# Patient Record
Sex: FEMALE | Race: WHITE | NOT HISPANIC OR LATINO | URBAN - METROPOLITAN AREA
[De-identification: names, ages, dates, MRNs, and addresses within clinical notes are randomized per-mention and may not be internally consistent; named-entity substitution may affect disease eponyms.]

---

## 2022-03-29 PROBLEM — Z00.00 ENCOUNTER FOR PREVENTIVE HEALTH EXAMINATION: Status: ACTIVE | Noted: 2022-03-29

## 2023-02-09 ENCOUNTER — OFFICE (OUTPATIENT)
Dept: URBAN - METROPOLITAN AREA CLINIC 29 | Facility: CLINIC | Age: 73
Setting detail: OPHTHALMOLOGY
End: 2023-02-09
Payer: MEDICARE

## 2023-02-09 DIAGNOSIS — H01.005: ICD-10-CM

## 2023-02-09 DIAGNOSIS — H43.393: ICD-10-CM

## 2023-02-09 DIAGNOSIS — H01.001: ICD-10-CM

## 2023-02-09 DIAGNOSIS — H01.002: ICD-10-CM

## 2023-02-09 DIAGNOSIS — H02.834: ICD-10-CM

## 2023-02-09 DIAGNOSIS — H01.004: ICD-10-CM

## 2023-02-09 DIAGNOSIS — Z96.1: ICD-10-CM

## 2023-02-09 DIAGNOSIS — H02.831: ICD-10-CM

## 2023-02-09 PROCEDURE — 92014 COMPRE OPH EXAM EST PT 1/>: CPT | Performed by: OPHTHALMOLOGY

## 2023-02-09 PROCEDURE — 92201 OPSCPY EXTND RTA DRAW UNI/BI: CPT | Performed by: OPHTHALMOLOGY

## 2023-02-09 ASSESSMENT — REFRACTION_MANIFEST
OD_CYLINDER: +0.25
OS_AXIS: 177
OD_VA1: 20/25+2
OD_AXIS: 010
OD_ADD: +2.50
OD_SPHERE: +1.50
OD_AXIS: 175
OS_CYLINDER: +1.50
OS_VA1: 20/30+2
OD_VA1: 20/30
OD_SPHERE: -0.25
OS_SPHERE: +1.75
OS_ADD: +2.50
OS_SPHERE: -0.50
OS_CYLINDER: +1.50
OD_SPHERE: +1.50
OD_CYLINDER: +1.75
OS_VA1: 20/25
OS_VA1: 20/25
OS_SPHERE: +2.00
OD_CYLINDER: +0.50
OD_SPHERE: -0.50
OS_CYLINDER: +0.50
OS_ADD: +2.50
OD_AXIS: 175
OD_VA1: 20/40
OD_ADD: +2.50
OS_AXIS: 180
OD_VA1: 20/25-1
OD_AXIS: 5
OU_VA: 20/30+2
OD_CYLINDER: +1.75
OS_AXIS: 025

## 2023-02-09 ASSESSMENT — KERATOMETRY
OD_K1POWER_DIOPTERS: 42.50
OD_K2POWER_DIOPTERS: 44.00
OD_AXISANGLE_DEGREES: 007
OS_K2POWER_DIOPTERS: 44.25
OS_K1POWER_DIOPTERS: 43.50
OS_AXISANGLE_DEGREES: 161

## 2023-02-09 ASSESSMENT — AXIALLENGTH_DERIVED
OS_AL: 22.5302
OS_AL: 23.5516
OD_AL: 23.7827
OD_AL: 22.7869
OS_AL: 23.5032
OS_AL: 22.4417
OD_AL: 22.7869
OD_AL: 23.7333
OD_AL: 23.7333

## 2023-02-09 ASSESSMENT — SPHEQUIV_DERIVED
OD_SPHEQUIV: -0.125
OS_SPHEQUIV: -0.125
OD_SPHEQUIV: 2.375
OD_SPHEQUIV: 2.375
OS_SPHEQUIV: 2.5
OD_SPHEQUIV: -0.25
OS_SPHEQUIV: -0.25
OD_SPHEQUIV: -0.125
OS_SPHEQUIV: 2.75

## 2023-02-09 ASSESSMENT — REFRACTION_AUTOREFRACTION
OS_AXIS: 2
OD_SPHERE: -0.50
OS_CYLINDER: +0.25
OS_SPHERE: -0.25
OD_AXIS: 165
OD_CYLINDER: +0.75

## 2023-02-09 ASSESSMENT — REFRACTION_CURRENTRX
OD_CYLINDER: +1.25
OS_CYLINDER: +1.00
OD_ADD: +2.00
OD_SPHERE: +1.50
OS_SPHERE: +2.00
OS_OVR_VA: 20/
OD_OVR_VA: 20/
OS_ADD: +2.00
OD_AXIS: 175
OS_AXIS: 008

## 2023-02-09 ASSESSMENT — LID POSITION - DERMATOCHALASIS
OS_DERMATOCHALASIS: LUL 2+ 3+
OD_DERMATOCHALASIS: RUL 2+ 3+

## 2023-02-09 ASSESSMENT — LID EXAM ASSESSMENTS
OS_BLEPHARITIS: LLL LUL 2+ 3+
OS_ECCHYMOSIS: LLL 1+
OD_BLEPHARITIS: RLL RUL 2+ 3+

## 2023-02-09 ASSESSMENT — TONOMETRY
OS_IOP_MMHG: 16
OD_IOP_MMHG: 16

## 2023-02-09 ASSESSMENT — CONFRONTATIONAL VISUAL FIELD TEST (CVF)
OS_FINDINGS: FULL
OD_FINDINGS: FULL

## 2023-02-09 ASSESSMENT — VISUAL ACUITY
OS_BCVA: 20/25
OD_BCVA: 20/25

## 2023-04-01 ENCOUNTER — OFFICE (OUTPATIENT)
Dept: URBAN - METROPOLITAN AREA CLINIC 29 | Facility: CLINIC | Age: 73
Setting detail: OPHTHALMOLOGY
End: 2023-04-01
Payer: MEDICARE

## 2023-04-01 DIAGNOSIS — H43.393: ICD-10-CM

## 2023-04-01 DIAGNOSIS — H01.005: ICD-10-CM

## 2023-04-01 DIAGNOSIS — H02.831: ICD-10-CM

## 2023-04-01 DIAGNOSIS — Z96.1: ICD-10-CM

## 2023-04-01 DIAGNOSIS — H01.004: ICD-10-CM

## 2023-04-01 DIAGNOSIS — H01.001: ICD-10-CM

## 2023-04-01 DIAGNOSIS — H01.002: ICD-10-CM

## 2023-04-01 DIAGNOSIS — H02.834: ICD-10-CM

## 2023-04-01 PROCEDURE — 99214 OFFICE O/P EST MOD 30 MIN: CPT | Performed by: OPHTHALMOLOGY

## 2023-04-01 ASSESSMENT — REFRACTION_MANIFEST
OD_ADD: +2.50
OS_ADD: +2.50
OD_CYLINDER: +1.75
OD_VA1: 20/25-1
OD_SPHERE: -0.25
OD_AXIS: 5
OS_CYLINDER: +1.50
OS_SPHERE: -0.50
OS_SPHERE: +2.00
OD_SPHERE: +1.50
OD_AXIS: 010
OD_AXIS: 175
OD_VA1: 20/25+2
OD_CYLINDER: +0.25
OD_SPHERE: -0.50
OD_VA1: 20/30
OD_VA1: 20/40
OS_AXIS: 025
OS_AXIS: 180
OS_CYLINDER: +1.50
OS_VA1: 20/30+2
OD_CYLINDER: +0.50
OS_VA1: 20/25
OS_AXIS: 177
OS_ADD: +2.50
OD_CYLINDER: +1.75
OS_CYLINDER: +0.50
OS_SPHERE: +1.75
OD_SPHERE: +1.50
OS_VA1: 20/25
OD_ADD: +2.50
OD_AXIS: 175
OU_VA: 20/30+2

## 2023-04-01 ASSESSMENT — AXIALLENGTH_DERIVED
OD_AL: 22.7869
OD_AL: 22.7869
OD_AL: 23.7333
OD_AL: 23.7333
OS_AL: 22.4417
OS_AL: 22.5302
OS_AL: 23.5516
OD_AL: 23.7827
OS_AL: 23.5032

## 2023-04-01 ASSESSMENT — SPHEQUIV_DERIVED
OD_SPHEQUIV: 2.375
OD_SPHEQUIV: -0.25
OS_SPHEQUIV: 2.75
OD_SPHEQUIV: -0.125
OS_SPHEQUIV: 2.5
OS_SPHEQUIV: -0.125
OS_SPHEQUIV: -0.25
OD_SPHEQUIV: -0.125
OD_SPHEQUIV: 2.375

## 2023-04-01 ASSESSMENT — LID EXAM ASSESSMENTS
OS_BLEPHARITIS: LLL LUL 3+ 4+
OD_BLEPHARITIS: RLL RUL 3+ 4+

## 2023-04-01 ASSESSMENT — REFRACTION_AUTOREFRACTION
OS_CYLINDER: +0.25
OS_AXIS: 2
OD_SPHERE: -0.50
OS_SPHERE: -0.25
OD_CYLINDER: +0.75
OD_AXIS: 165

## 2023-04-01 ASSESSMENT — KERATOMETRY
OD_AXISANGLE_DEGREES: 007
OD_K2POWER_DIOPTERS: 44.00
OS_AXISANGLE_DEGREES: 161
OD_K1POWER_DIOPTERS: 42.50
OS_K2POWER_DIOPTERS: 44.25
OS_K1POWER_DIOPTERS: 43.50

## 2023-04-01 ASSESSMENT — REFRACTION_CURRENTRX
OD_OVR_VA: 20/
OS_AXIS: 008
OD_ADD: +2.00
OS_OVR_VA: 20/
OS_SPHERE: +2.00
OS_CYLINDER: +1.00
OD_AXIS: 175
OD_SPHERE: +1.50
OS_ADD: +2.00
OD_CYLINDER: +1.25

## 2023-04-01 ASSESSMENT — LID POSITION - DERMATOCHALASIS
OD_DERMATOCHALASIS: RUL 2+ 3+
OS_DERMATOCHALASIS: LUL 2+ 3+

## 2023-04-01 ASSESSMENT — VISUAL ACUITY
OD_BCVA: 20/25
OS_BCVA: 20/25-1

## 2023-04-25 ENCOUNTER — OFFICE (OUTPATIENT)
Dept: URBAN - METROPOLITAN AREA CLINIC 29 | Facility: CLINIC | Age: 73
Setting detail: OPHTHALMOLOGY
End: 2023-04-25
Payer: MEDICARE

## 2023-04-25 DIAGNOSIS — H01.005: ICD-10-CM

## 2023-04-25 DIAGNOSIS — H01.004: ICD-10-CM

## 2023-04-25 DIAGNOSIS — H02.831: ICD-10-CM

## 2023-04-25 DIAGNOSIS — Z96.1: ICD-10-CM

## 2023-04-25 DIAGNOSIS — H01.001: ICD-10-CM

## 2023-04-25 DIAGNOSIS — H01.002: ICD-10-CM

## 2023-04-25 DIAGNOSIS — H02.834: ICD-10-CM

## 2023-04-25 PROCEDURE — 92083 EXTENDED VISUAL FIELD XM: CPT | Performed by: OPHTHALMOLOGY

## 2023-04-25 PROCEDURE — 92285 EXTERNAL OCULAR PHOTOGRAPHY: CPT | Performed by: OPHTHALMOLOGY

## 2023-04-25 PROCEDURE — 92012 INTRM OPH EXAM EST PATIENT: CPT | Performed by: OPHTHALMOLOGY

## 2023-04-25 ASSESSMENT — REFRACTION_MANIFEST
OS_VA1: 20/30+2
OD_CYLINDER: +1.75
OS_CYLINDER: +1.50
OD_VA1: 20/25+2
OD_VA1: 20/25-1
OD_ADD: +2.50
OD_SPHERE: -0.50
OS_VA1: 20/25
OD_AXIS: 175
OD_AXIS: 010
OS_CYLINDER: +0.50
OS_ADD: +2.50
OD_CYLINDER: +1.75
OD_CYLINDER: +0.50
OD_ADD: +2.50
OS_CYLINDER: +1.50
OS_SPHERE: +1.75
OD_AXIS: 175
OD_SPHERE: +1.50
OS_AXIS: 180
OS_AXIS: 025
OD_SPHERE: +1.50
OS_VA1: 20/25
OD_VA1: 20/30
OS_SPHERE: +2.00
OD_SPHERE: -0.25
OU_VA: 20/30+2
OD_AXIS: 5
OS_SPHERE: -0.50
OS_AXIS: 177
OD_CYLINDER: +0.25
OD_VA1: 20/40
OS_ADD: +2.50

## 2023-04-25 ASSESSMENT — REFRACTION_CURRENTRX
OS_CYLINDER: +1.00
OD_ADD: +2.00
OS_ADD: +2.00
OD_OVR_VA: 20/
OD_CYLINDER: +1.25
OS_OVR_VA: 20/
OS_SPHERE: +2.00
OD_SPHERE: +1.50
OD_AXIS: 175
OS_AXIS: 008

## 2023-04-25 ASSESSMENT — AXIALLENGTH_DERIVED
OD_AL: 23.7333
OS_AL: 23.5032
OD_AL: 23.7333
OD_AL: 23.7827
OS_AL: 22.5302
OD_AL: 22.7869
OS_AL: 23.5516
OD_AL: 22.7869
OS_AL: 22.4417

## 2023-04-25 ASSESSMENT — REFRACTION_AUTOREFRACTION
OD_SPHERE: -0.50
OD_AXIS: 165
OS_AXIS: 2
OD_CYLINDER: +0.75
OS_CYLINDER: +0.25
OS_SPHERE: -0.25

## 2023-04-25 ASSESSMENT — VISUAL ACUITY
OD_BCVA: 20/25
OS_BCVA: 20/25

## 2023-04-25 ASSESSMENT — KERATOMETRY
OS_K1POWER_DIOPTERS: 43.50
OD_AXISANGLE_DEGREES: 007
OD_K2POWER_DIOPTERS: 44.00
OS_AXISANGLE_DEGREES: 161
OS_K2POWER_DIOPTERS: 44.25
OD_K1POWER_DIOPTERS: 42.50

## 2023-04-25 ASSESSMENT — SPHEQUIV_DERIVED
OD_SPHEQUIV: 2.375
OD_SPHEQUIV: -0.125
OD_SPHEQUIV: 2.375
OS_SPHEQUIV: 2.75
OD_SPHEQUIV: -0.125
OD_SPHEQUIV: -0.25
OS_SPHEQUIV: -0.125
OS_SPHEQUIV: -0.25
OS_SPHEQUIV: 2.5

## 2023-04-25 ASSESSMENT — CONFRONTATIONAL VISUAL FIELD TEST (CVF)
OD_FINDINGS: FULL
OS_FINDINGS: FULL

## 2023-04-25 ASSESSMENT — LID POSITION - DERMATOCHALASIS
OS_DERMATOCHALASIS: LUL 2+ 3+
OD_DERMATOCHALASIS: RUL 2+ 3+

## 2023-04-25 ASSESSMENT — LID EXAM ASSESSMENTS
OD_BLEPHARITIS: RLL RUL 3+ 4+
OS_BLEPHARITIS: LLL LUL 3+ 4+

## 2023-05-01 ENCOUNTER — AMBULATORY SURGERY CENTER (OUTPATIENT)
Dept: URBAN - METROPOLITAN AREA SURGERY 17 | Facility: SURGERY | Age: 73
Setting detail: OPHTHALMOLOGY
End: 2023-05-01
Payer: MEDICARE

## 2023-05-01 DIAGNOSIS — H02.831: ICD-10-CM

## 2023-05-01 DIAGNOSIS — H02.834: ICD-10-CM

## 2023-05-01 PROCEDURE — 15823 BLEPHARP UPR EYELID XCSV SKN: CPT | Performed by: OPHTHALMOLOGY

## 2023-05-08 ENCOUNTER — RX ONLY (RX ONLY)
Age: 73
End: 2023-05-08

## 2023-05-08 ENCOUNTER — OFFICE (OUTPATIENT)
Dept: URBAN - METROPOLITAN AREA CLINIC 29 | Facility: CLINIC | Age: 73
Setting detail: OPHTHALMOLOGY
End: 2023-05-08
Payer: MEDICARE

## 2023-05-08 DIAGNOSIS — H02.831: ICD-10-CM

## 2023-05-08 DIAGNOSIS — H02.834: ICD-10-CM

## 2023-05-08 PROCEDURE — 99024 POSTOP FOLLOW-UP VISIT: CPT | Performed by: OPHTHALMOLOGY

## 2023-05-08 ASSESSMENT — REFRACTION_MANIFEST
OS_VA1: 20/25
OS_VA1: 20/25
OD_CYLINDER: +1.75
OD_SPHERE: +1.50
OD_AXIS: 010
OD_CYLINDER: +0.25
OS_CYLINDER: +1.50
OS_CYLINDER: +1.50
OS_AXIS: 025
OD_AXIS: 175
OD_VA1: 20/25-1
OD_CYLINDER: +1.75
OD_AXIS: 175
OS_SPHERE: -0.50
OS_CYLINDER: +0.50
OS_VA1: 20/30+2
OS_AXIS: 177
OD_CYLINDER: +0.50
OD_AXIS: 5
OS_SPHERE: +2.00
OD_ADD: +2.50
OD_ADD: +2.50
OD_SPHERE: -0.50
OU_VA: 20/30+2
OS_SPHERE: +1.75
OD_VA1: 20/40
OD_SPHERE: -0.25
OD_SPHERE: +1.50
OD_VA1: 20/25+2
OS_ADD: +2.50
OS_AXIS: 180
OD_VA1: 20/30
OS_ADD: +2.50

## 2023-05-08 ASSESSMENT — LID POSITION - COMMENTS
OS_COMMENTS: REMOVED
OD_COMMENTS: REMOVED
OD_COMMENTS: HEALING WELL
OD_COMMENTS: SUTURES INTACT
OS_COMMENTS: SUTURES INTACT
OS_COMMENTS: HEALING WELL

## 2023-05-08 ASSESSMENT — REFRACTION_AUTOREFRACTION
OD_SPHERE: -0.50
OS_SPHERE: -0.25
OS_CYLINDER: +0.25
OD_CYLINDER: +0.75
OS_AXIS: 2
OD_AXIS: 165

## 2023-05-08 ASSESSMENT — AXIALLENGTH_DERIVED
OS_AL: 22.4417
OD_AL: 22.7869
OS_AL: 23.5032
OD_AL: 23.7827
OS_AL: 23.5516
OD_AL: 23.7333
OD_AL: 22.7869
OS_AL: 22.5302
OD_AL: 23.7333

## 2023-05-08 ASSESSMENT — REFRACTION_CURRENTRX
OS_CYLINDER: +1.00
OD_SPHERE: +1.50
OS_AXIS: 008
OS_OVR_VA: 20/
OS_ADD: +2.00
OS_SPHERE: +2.00
OD_CYLINDER: +1.25
OD_ADD: +2.00
OD_AXIS: 175
OD_OVR_VA: 20/

## 2023-05-08 ASSESSMENT — KERATOMETRY
OD_AXISANGLE_DEGREES: 007
OS_K1POWER_DIOPTERS: 43.50
OS_K2POWER_DIOPTERS: 44.25
OD_K2POWER_DIOPTERS: 44.00
OD_K1POWER_DIOPTERS: 42.50
OS_AXISANGLE_DEGREES: 161

## 2023-05-08 ASSESSMENT — SPHEQUIV_DERIVED
OS_SPHEQUIV: -0.125
OD_SPHEQUIV: 2.375
OD_SPHEQUIV: 2.375
OD_SPHEQUIV: -0.125
OS_SPHEQUIV: -0.25
OS_SPHEQUIV: 2.75
OD_SPHEQUIV: -0.25
OS_SPHEQUIV: 2.5
OD_SPHEQUIV: -0.125

## 2023-05-08 ASSESSMENT — LID EXAM ASSESSMENTS
OS_BLEPHARITIS: LLL LUL 3+ 4+
OD_BLEPHARITIS: RLL RUL 3+ 4+

## 2023-05-08 ASSESSMENT — VISUAL ACUITY
OD_BCVA: DEFER
OS_BCVA: DEFER

## 2023-07-06 ENCOUNTER — OFFICE (OUTPATIENT)
Dept: URBAN - METROPOLITAN AREA CLINIC 29 | Facility: CLINIC | Age: 73
Setting detail: OPHTHALMOLOGY
End: 2023-07-06
Payer: MEDICARE

## 2023-07-06 DIAGNOSIS — H02.834: ICD-10-CM

## 2023-07-06 DIAGNOSIS — H02.831: ICD-10-CM

## 2023-07-06 PROBLEM — H01.002 BLEPHARITIS; RIGHT UPPER LID, RIGHT LOWER LID, LEFT UPPER LID, LEFT LOWER LID: Status: ACTIVE | Noted: 2023-07-06

## 2023-07-06 PROBLEM — H01.001 BLEPHARITIS; RIGHT UPPER LID, RIGHT LOWER LID, LEFT UPPER LID, LEFT LOWER LID: Status: ACTIVE | Noted: 2023-07-06

## 2023-07-06 PROBLEM — H01.005 BLEPHARITIS; RIGHT UPPER LID, RIGHT LOWER LID, LEFT UPPER LID, LEFT LOWER LID: Status: ACTIVE | Noted: 2023-07-06

## 2023-07-06 PROBLEM — H01.004 BLEPHARITIS; RIGHT UPPER LID, RIGHT LOWER LID, LEFT UPPER LID, LEFT LOWER LID: Status: ACTIVE | Noted: 2023-07-06

## 2023-07-06 PROCEDURE — 99024 POSTOP FOLLOW-UP VISIT: CPT | Performed by: OPHTHALMOLOGY

## 2023-07-06 ASSESSMENT — REFRACTION_MANIFEST
OD_SPHERE: +1.50
OD_SPHERE: +1.50
OD_VA1: 20/30
OD_CYLINDER: +0.50
OD_SPHERE: -0.25
OS_AXIS: 177
OS_ADD: +2.50
OS_SPHERE: +1.75
OS_AXIS: 180
OD_AXIS: 5
OD_VA1: 20/25+2
OS_VA1: 20/25
OD_VA1: 20/25-1
OD_AXIS: 010
OS_VA1: 20/30+2
OS_SPHERE: +2.00
OS_CYLINDER: +0.50
OD_SPHERE: -0.50
OD_CYLINDER: +1.75
OU_VA: 20/30+2
OD_CYLINDER: +1.75
OD_ADD: +2.50
OD_CYLINDER: +0.25
OS_ADD: +2.50
OS_AXIS: 025
OS_CYLINDER: +1.50
OD_VA1: 20/40
OD_AXIS: 175
OD_ADD: +2.50
OS_CYLINDER: +1.50
OS_VA1: 20/25
OS_SPHERE: -0.50
OD_AXIS: 175

## 2023-07-06 ASSESSMENT — KERATOMETRY
OD_K2POWER_DIOPTERS: 44.00
OS_K2POWER_DIOPTERS: 44.25
OD_AXISANGLE_DEGREES: 007
OS_K1POWER_DIOPTERS: 43.50
OS_AXISANGLE_DEGREES: 161
OD_K1POWER_DIOPTERS: 42.50

## 2023-07-06 ASSESSMENT — REFRACTION_CURRENTRX
OS_CYLINDER: +1.00
OD_ADD: +2.00
OD_SPHERE: +1.50
OS_ADD: +2.00
OD_AXIS: 175
OD_OVR_VA: 20/
OS_OVR_VA: 20/
OD_CYLINDER: +1.25
OS_AXIS: 008
OS_SPHERE: +2.00

## 2023-07-06 ASSESSMENT — LID POSITION - COMMENTS
OS_COMMENTS: HEALING WELL
OD_COMMENTS: HEALING WELL

## 2023-07-06 ASSESSMENT — REFRACTION_AUTOREFRACTION
OD_CYLINDER: +0.75
OS_SPHERE: -0.25
OS_CYLINDER: +0.25
OD_SPHERE: -0.50
OS_AXIS: 2
OD_AXIS: 165

## 2023-07-06 ASSESSMENT — AXIALLENGTH_DERIVED
OS_AL: 22.5302
OS_AL: 23.5516
OS_AL: 22.4417
OD_AL: 22.7869
OD_AL: 22.7869
OD_AL: 23.7333
OS_AL: 23.5032
OD_AL: 23.7333
OD_AL: 23.7827

## 2023-07-06 ASSESSMENT — VISUAL ACUITY
OD_BCVA: 20/25-1
OS_BCVA: 20/30-2

## 2023-07-06 ASSESSMENT — LID EXAM ASSESSMENTS
OS_BLEPHARITIS: LLL LUL 3+ 4+
OD_BLEPHARITIS: RLL RUL 3+ 4+

## 2023-07-06 ASSESSMENT — SPHEQUIV_DERIVED
OD_SPHEQUIV: 2.375
OS_SPHEQUIV: 2.5
OD_SPHEQUIV: -0.125
OS_SPHEQUIV: -0.25
OS_SPHEQUIV: -0.125
OD_SPHEQUIV: 2.375
OS_SPHEQUIV: 2.75
OD_SPHEQUIV: -0.125
OD_SPHEQUIV: -0.25

## 2023-07-06 ASSESSMENT — CONFRONTATIONAL VISUAL FIELD TEST (CVF)
OS_FINDINGS: FULL
OD_FINDINGS: FULL

## 2023-11-10 ENCOUNTER — OFFICE (OUTPATIENT)
Dept: URBAN - METROPOLITAN AREA CLINIC 29 | Facility: CLINIC | Age: 73
Setting detail: OPHTHALMOLOGY
End: 2023-11-10
Payer: MEDICARE

## 2023-11-10 DIAGNOSIS — H01.002: ICD-10-CM

## 2023-11-10 DIAGNOSIS — H53.422: ICD-10-CM

## 2023-11-10 DIAGNOSIS — Z96.1: ICD-10-CM

## 2023-11-10 DIAGNOSIS — H01.004: ICD-10-CM

## 2023-11-10 DIAGNOSIS — H43.393: ICD-10-CM

## 2023-11-10 DIAGNOSIS — H16.223: ICD-10-CM

## 2023-11-10 DIAGNOSIS — H01.001: ICD-10-CM

## 2023-11-10 DIAGNOSIS — H01.005: ICD-10-CM

## 2023-11-10 PROCEDURE — 92014 COMPRE OPH EXAM EST PT 1/>: CPT | Performed by: OPHTHALMOLOGY

## 2023-11-10 PROCEDURE — 92083 EXTENDED VISUAL FIELD XM: CPT | Performed by: OPHTHALMOLOGY

## 2023-11-10 ASSESSMENT — SPHEQUIV_DERIVED
OD_SPHEQUIV: -0.25
OS_SPHEQUIV: 2.75
OS_SPHEQUIV: 2.5
OD_SPHEQUIV: -0.125
OS_SPHEQUIV: 0.5
OS_SPHEQUIV: -0.25
OD_SPHEQUIV: 2.375
OD_SPHEQUIV: 2.375

## 2023-11-10 ASSESSMENT — REFRACTION_CURRENTRX
OS_ADD: +2.25
OS_ADD: +2.00
OS_AXIS: 008
OD_CYLINDER: +1.25
OS_SPHERE: +2.00
OD_ADD: +2.00
OD_ADD: +2.25
OS_SPHERE: -0.25
OS_AXIS: 30
OS_CYLINDER: +0.25
OD_AXIS: 171
OD_SPHERE: -0.25
OD_OVR_VA: 20/
OS_CYLINDER: +1.00
OS_OVR_VA: 20/
OD_CYLINDER: +0.25
OS_OVR_VA: 20/
OD_SPHERE: +1.50
OD_AXIS: 175
OD_OVR_VA: 20/

## 2023-11-10 ASSESSMENT — REFRACTION_MANIFEST
OD_AXIS: 175
OD_VA1: 20/25+2
OD_SPHERE: -0.25
OS_AXIS: 177
OD_AXIS: 010
OD_SPHERE: +1.50
OD_ADD: +2.50
OS_SPHERE: +2.00
OD_SPHERE: +1.50
OS_SPHERE: +1.75
OS_ADD: +2.50
OD_VA1: 20/25-1
OD_AXIS: 5
OD_ADD: +2.50
OD_SPHERE: -0.50
OD_VA1: 20/30
OS_CYLINDER: +1.50
OS_AXIS: 180
OS_CYLINDER: +0.50
OS_CYLINDER: +1.50
OD_CYLINDER: +0.25
OD_VA1: 20/40
OS_VA1: 20/30+2
OS_SPHERE: -0.50
OS_AXIS: 025
OD_CYLINDER: +1.75
OS_VA1: 20/25
OS_ADD: +2.50
OU_VA: 20/30+2
OD_CYLINDER: +1.75
OD_CYLINDER: +0.50
OD_AXIS: 175
OS_VA1: 20/25

## 2023-11-10 ASSESSMENT — REFRACTION_AUTOREFRACTION
OD_SPHERE: PLANO
OD_CYLINDER: +0.25
OD_AXIS: 3
OS_CYLINDER: +0.50
OS_AXIS: 166
OS_SPHERE: +0.25

## 2023-11-10 ASSESSMENT — LID EXAM ASSESSMENTS
OS_BLEPHARITIS: LLL LUL 1+
OD_BLEPHARITIS: RLL RUL 1+

## 2023-11-10 ASSESSMENT — CONFRONTATIONAL VISUAL FIELD TEST (CVF)
OS_FINDINGS: FULL
OD_FINDINGS: FULL

## 2023-11-10 ASSESSMENT — SUPERFICIAL PUNCTATE KERATITIS (SPK)
OS_SPK: 1+ 2+
OD_SPK: 1+ 2+

## 2023-11-10 ASSESSMENT — LID POSITION - COMMENTS
OD_COMMENTS: HEALING WELL
OS_COMMENTS: HEALING WELL

## 2024-03-19 ENCOUNTER — APPOINTMENT (OUTPATIENT)
Dept: UROLOGY | Facility: CLINIC | Age: 74
End: 2024-03-19
Payer: MEDICARE

## 2024-03-19 VITALS
HEIGHT: 65 IN | BODY MASS INDEX: 22.82 KG/M2 | DIASTOLIC BLOOD PRESSURE: 80 MMHG | SYSTOLIC BLOOD PRESSURE: 142 MMHG | WEIGHT: 137 LBS

## 2024-03-19 DIAGNOSIS — Z80.8 FAMILY HISTORY OF MALIGNANT NEOPLASM OF OTHER ORGANS OR SYSTEMS: ICD-10-CM

## 2024-03-19 PROCEDURE — 99204 OFFICE O/P NEW MOD 45 MIN: CPT

## 2024-03-19 RX ORDER — DULOXETINE HYDROCHLORIDE 60 MG/1
60 CAPSULE, DELAYED RELEASE ORAL
Refills: 0 | Status: ACTIVE | COMMUNITY

## 2024-03-19 RX ORDER — CHROMIUM 200 MCG
400 TABLET ORAL
Refills: 0 | Status: ACTIVE | COMMUNITY

## 2024-03-19 RX ORDER — CELECOXIB 200 MG/1
200 CAPSULE ORAL
Refills: 0 | Status: ACTIVE | COMMUNITY

## 2024-03-19 RX ORDER — CEVIMELINE HYDROCHLORIDE 30 MG/1
30 CAPSULE ORAL
Refills: 0 | Status: ACTIVE | COMMUNITY

## 2024-03-19 RX ORDER — FLUTICASONE PROPIONATE 50 MCG
50 SPRAY, SUSPENSION NASAL
Refills: 0 | Status: ACTIVE | COMMUNITY

## 2024-03-19 RX ORDER — TIOTROPIUM BROMIDE 18 UG/1
18 CAPSULE ORAL; RESPIRATORY (INHALATION)
Refills: 0 | Status: ACTIVE | COMMUNITY

## 2024-03-19 RX ORDER — TIZANIDINE 2 MG/1
2 TABLET ORAL
Refills: 0 | Status: ACTIVE | COMMUNITY

## 2024-03-19 RX ORDER — MIDODRINE HYDROCHLORIDE 5 MG/1
5 TABLET ORAL
Refills: 0 | Status: ACTIVE | COMMUNITY

## 2024-03-19 RX ORDER — MIRTAZAPINE 15 MG/1
15 TABLET, FILM COATED ORAL
Refills: 0 | Status: ACTIVE | COMMUNITY

## 2024-03-19 RX ORDER — ALBUTEROL SULFATE 90 UG/1
INHALANT RESPIRATORY (INHALATION)
Refills: 0 | Status: ACTIVE | COMMUNITY

## 2024-03-19 RX ORDER — ZOLPIDEM TARTRATE 5 MG/1
5 TABLET, FILM COATED ORAL
Refills: 0 | Status: ACTIVE | COMMUNITY

## 2024-03-19 RX ORDER — LEVOTHYROXINE SODIUM 0.05 MG/1
50 TABLET ORAL
Refills: 0 | Status: ACTIVE | COMMUNITY

## 2024-03-19 RX ORDER — METHOTREXATE 2.5 MG/1
2.5 TABLET ORAL
Refills: 0 | Status: ACTIVE | COMMUNITY

## 2024-03-19 RX ORDER — GABAPENTIN 300 MG/1
300 CAPSULE ORAL
Refills: 0 | Status: ACTIVE | COMMUNITY

## 2024-03-19 RX ORDER — ALPRAZOLAM 0.25 MG/1
0.25 TABLET ORAL
Refills: 0 | Status: ACTIVE | COMMUNITY

## 2024-03-19 RX ORDER — FOLIC ACID 1 MG/1
1 TABLET ORAL
Refills: 0 | Status: ACTIVE | COMMUNITY

## 2024-03-19 RX ORDER — PANTOPRAZOLE 40 MG/1
40 TABLET, DELAYED RELEASE ORAL
Refills: 0 | Status: ACTIVE | COMMUNITY

## 2024-03-19 RX ORDER — PRAVASTATIN SODIUM 40 MG/1
40 TABLET ORAL
Refills: 0 | Status: ACTIVE | COMMUNITY

## 2024-03-19 RX ORDER — PRIMIDONE 250 MG/1
250 TABLET ORAL
Refills: 0 | Status: ACTIVE | COMMUNITY

## 2024-03-20 NOTE — PHYSICAL EXAM
[General Appearance - Well Developed] : well developed [General Appearance - In No Acute Distress] : no acute distress [Respiration, Rhythm And Depth] : normal respiratory rhythm and effort [] : no rash [Oriented To Time, Place, And Person] : oriented to person, place, and time [FreeTextEntry1] : Significant essential tremor

## 2024-03-20 NOTE — ASSESSMENT
[FreeTextEntry1] : Patient is a 73-year-old female was referred today for worsening urgency frequency and urge incontinence.  She states that the symptoms have progressively worsened over the last 12 months to the point where she must constantly wear pads.  If she hears any running water she will immediately leak.  She has never received any prior treatment.  She denies any hematuria, dysuria or pelvic pain.  She has no history of kidney stones or recurrent urinary tract infections.  Modifying factors include neurologic disorder including essential tremor and syncope.  Assessment and plan 1.  Overactive bladder 2.  Urge incontinence 3.  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  My plan is to start her on Gemtesa 75 mg p.o. daily.  If this does not benefit her I will proceed with urodynamics given her essential tremor and syncope to rule out a neurologic cause.

## 2024-04-30 ENCOUNTER — APPOINTMENT (OUTPATIENT)
Dept: UROLOGY | Facility: CLINIC | Age: 74
End: 2024-04-30
Payer: MEDICARE

## 2024-04-30 VITALS
DIASTOLIC BLOOD PRESSURE: 79 MMHG | HEIGHT: 65 IN | BODY MASS INDEX: 22.82 KG/M2 | SYSTOLIC BLOOD PRESSURE: 174 MMHG | WEIGHT: 137 LBS | HEART RATE: 88 BPM

## 2024-04-30 DIAGNOSIS — N39.41 URGE INCONTINENCE: ICD-10-CM

## 2024-04-30 DIAGNOSIS — R35.0 FREQUENCY OF MICTURITION: ICD-10-CM

## 2024-04-30 PROCEDURE — 99213 OFFICE O/P EST LOW 20 MIN: CPT

## 2024-04-30 RX ORDER — VIBEGRON 75 MG/1
75 TABLET, FILM COATED ORAL
Qty: 90 | Refills: 1 | Status: ACTIVE | COMMUNITY
Start: 2024-03-19 | End: 1900-01-01

## 2024-04-30 NOTE — ASSESSMENT
[FreeTextEntry1] : Patient is a 73-year-old female with urgency, frequency and urge incontinence. She was started on Gemtesa 75 mg p.o. daily with very good results.  She had an episode of fainting but does have a neurologic disorder and history of hypertension.  She denies any interval gross hematuria, dysuria or pelvic pain.  Assessment and plan 1. Overactive bladder 2. Urge incontinence Good results with Gemtesa 75 mg p.o. daily.  We will continue and she will return to the office in 2 months.

## 2024-07-30 ENCOUNTER — APPOINTMENT (OUTPATIENT)
Dept: UROLOGY | Facility: CLINIC | Age: 74
End: 2024-07-30
Payer: MEDICARE

## 2024-07-30 VITALS
SYSTOLIC BLOOD PRESSURE: 158 MMHG | HEART RATE: 90 BPM | HEIGHT: 65 IN | BODY MASS INDEX: 22.82 KG/M2 | DIASTOLIC BLOOD PRESSURE: 83 MMHG | WEIGHT: 137 LBS

## 2024-07-30 DIAGNOSIS — R35.0 FREQUENCY OF MICTURITION: ICD-10-CM

## 2024-07-30 DIAGNOSIS — N39.41 URGE INCONTINENCE: ICD-10-CM

## 2024-07-30 PROCEDURE — 99213 OFFICE O/P EST LOW 20 MIN: CPT

## 2024-07-30 NOTE — PHYSICAL EXAM
[Oriented To Time, Place, And Person] : oriented to person, place, and time [FreeTextEntry1] : Patient appears very weak and somnolent

## 2024-07-30 NOTE — ASSESSMENT
[FreeTextEntry1] : Patient is a 74-year-old female with urgency, frequency and urge incontinence. She is on Gemtesa 75 mg p.o. daily with continued good results. She had an worsening episodes of fainting but does have a neurologic disorder and history of hypertension. She denies any interval gross hematuria, dysuria or pelvic pain.  Assessment and plan 1. Overactive bladder 2. Urge incontinence Continued good results with Gemtesa 75 mg p.o. daily. We will continue and she will return to the office in 3 months. I have spoken with her PCPs office and they will get her and now to evaluate her for her increased somnolence and fainting spells.

## 2024-08-17 ENCOUNTER — NON-APPOINTMENT (OUTPATIENT)
Age: 74
End: 2024-08-17

## 2024-08-19 ENCOUNTER — RESULT REVIEW (OUTPATIENT)
Age: 74
End: 2024-08-19

## 2024-08-20 ENCOUNTER — TRANSCRIPTION ENCOUNTER (OUTPATIENT)
Age: 74
End: 2024-08-20

## 2025-05-10 ENCOUNTER — TRANSCRIPTION ENCOUNTER (OUTPATIENT)
Age: 75
End: 2025-05-10